# Patient Record
Sex: MALE | Race: WHITE
[De-identification: names, ages, dates, MRNs, and addresses within clinical notes are randomized per-mention and may not be internally consistent; named-entity substitution may affect disease eponyms.]

---

## 2018-05-27 ENCOUNTER — HOSPITAL ENCOUNTER (EMERGENCY)
Dept: HOSPITAL 25 - ED | Age: 43
Discharge: HOME | End: 2018-05-27
Payer: COMMERCIAL

## 2018-05-27 VITALS — DIASTOLIC BLOOD PRESSURE: 83 MMHG | SYSTOLIC BLOOD PRESSURE: 125 MMHG

## 2018-05-27 DIAGNOSIS — R42: Primary | ICD-10-CM

## 2018-05-27 DIAGNOSIS — E86.0: ICD-10-CM

## 2018-05-27 DIAGNOSIS — R53.1: ICD-10-CM

## 2018-05-27 LAB
BASOPHILS # BLD AUTO: 0.1 10^3/UL (ref 0–0.2)
EOSINOPHIL # BLD AUTO: 0.4 10^3/UL (ref 0–0.6)
HCT VFR BLD AUTO: 44 % (ref 42–52)
HGB BLD-MCNC: 14.8 G/DL (ref 14–18)
LYMPHOCYTES # BLD AUTO: 3.3 10^3/UL (ref 1–4.8)
MCH RBC QN AUTO: 30 PG (ref 27–31)
MCHC RBC AUTO-ENTMCNC: 34 G/DL (ref 31–36)
MCV RBC AUTO: 89 FL (ref 80–94)
MONOCYTES # BLD AUTO: 0.7 10^3/UL (ref 0–0.8)
NEUTROPHILS # BLD AUTO: 5.5 10^3/UL (ref 1.5–7.7)
NRBC # BLD AUTO: 0 10^3/UL
NRBC BLD QL AUTO: 0
PLATELET # BLD AUTO: 248 10^3/UL (ref 150–450)
RBC # BLD AUTO: 4.9 10^6/UL (ref 4–5.4)
WBC # BLD AUTO: 10 10^3/UL (ref 3.5–10.8)

## 2018-05-27 PROCEDURE — 82550 ASSAY OF CK (CPK): CPT

## 2018-05-27 PROCEDURE — 85025 COMPLETE CBC W/AUTO DIFF WBC: CPT

## 2018-05-27 PROCEDURE — 70450 CT HEAD/BRAIN W/O DYE: CPT

## 2018-05-27 PROCEDURE — 83735 ASSAY OF MAGNESIUM: CPT

## 2018-05-27 PROCEDURE — 84443 ASSAY THYROID STIM HORMONE: CPT

## 2018-05-27 PROCEDURE — 36415 COLL VENOUS BLD VENIPUNCTURE: CPT

## 2018-05-27 PROCEDURE — 81003 URINALYSIS AUTO W/O SCOPE: CPT

## 2018-05-27 PROCEDURE — 71045 X-RAY EXAM CHEST 1 VIEW: CPT

## 2018-05-27 PROCEDURE — 99283 EMERGENCY DEPT VISIT LOW MDM: CPT

## 2018-05-27 PROCEDURE — 84484 ASSAY OF TROPONIN QUANT: CPT

## 2018-05-27 PROCEDURE — 86140 C-REACTIVE PROTEIN: CPT

## 2018-05-27 PROCEDURE — 80053 COMPREHEN METABOLIC PANEL: CPT

## 2018-05-27 PROCEDURE — 93005 ELECTROCARDIOGRAM TRACING: CPT

## 2018-05-27 PROCEDURE — 83880 ASSAY OF NATRIURETIC PEPTIDE: CPT

## 2018-05-27 PROCEDURE — 83605 ASSAY OF LACTIC ACID: CPT

## 2018-05-27 NOTE — ED
Jonas SCHAFER Stephanie, scribed for Rojas Barrios MD on 05/27/18 at 1710 .





Dizziness





- HPI Summary


HPI Summary: 


The pt is a 42 y/o M presenting to the ED with c/o dizziness that began at 14:

00 today. Symptoms include SOB and bilateral hand tingling. He denies CP, 

palpitations or difficulty swallowing. 








- History Of Current Complaint


Chief Complaint: EDDizziness


Stated Complaint: WEAKNESS/NUMBNESS IN BOTH HANDS


Time Seen by Provider: 05/27/18 16:51


Hx Obtained From: Patient


Onset/Duration: Still Present


Timing: Hours - 3


Severity Currently: Mild


Character: Dizzy


Aggravating Factor(s): Nothing


Alleviating Factor(s): Nothing


Associated Signs And Symptoms: Positive: Negative - difficulty swallowing, SOB, 

Other: - bilateral hand tingling.  Negative: Chest Pain, Palpitations





- Allergies/Home Medications


Allergies/Adverse Reactions: 


 Allergies











Allergy/AdvReac Type Severity Reaction Status Date / Time


 


egg Allergy  Rash Verified 05/27/18 16:42











Home Medications: 


 Home Medications





Diclofenac 1% GEL (NF) [Voltaren 1% GEL (NF)] 1 applic TOPICAL DAILY PRN 05/27/ 18 [History Confirmed 05/27/18]











PMH/Surg Hx/FS Hx/Imm Hx


Sensory History: 


   Denies: Hx Legally Blind


EENT History: 


   Denies: Hx Deafness





- Surgical History


Surgery Procedure, Year, and Place: NONE


Infectious Disease History: No


Infectious Disease History: 


   Denies: Traveled Outside the [place] in Last 30 Days





- Family History


Known Family History: 


   Negative: Renal Disease





- Social History


Occupation: Employed Full-time


Lives: With Family


Alcohol Use: Rare


Hx Substance Use: No


Substance Use Type: Reports: None


Hx Tobacco Use: No


Smoking Status (MU): Never Smoked Tobacco


Have You Smoked in the Last Year: No





Review of Systems


Negative: Fever


ENT: Negative - difficulty swallowing


Negative: Palpitations, Chest Pain


Positive: Shortness Of Breath


Neurological: Other - dizziness, bilateral hand tingling


All Other Systems Reviewed And Are Negative: Yes





Physical Exam





- Summary


Physical Exam Summary: 


 VITAL SIGNS: Reviewed.


GENERAL: Patient is a well-developed and nourished MALE who is lying 

comfortable in the stretcher. Patient is not in any acute respiratory distress.


HEAD AND FACE: No signs of trauma. No ecchymosis, hematomas or skull 

depressions. No sinus tenderness.


EYES: PERRLA, EOMI x 2, No injected conjunctiva, no nystagmus.


EARS: Hearing grossly intact. Ear canals and tympanic membranes are within 

normal limits.


MOUTH: Oropharynx within normal limits.


NECK: Supple, trachea is midline, no adenopathy, no JVD, no carotid bruit, no c-

spine tenderness, neck with full ROM.


CHEST: Symmetric, no tenderness at palpation


LUNGS: Clear to auscultation bilaterally. No wheezing or crackles.


CVS: Regular rate and rhythm, S1 and S2 present, no murmurs or gallops 

appreciated.


ABDOMEN: Soft, non-tender. No signs of distention. No rebound no guarding, and 

no masses palpated. Bowel sounds are normal.


EXTREMITIES: FROM in all major joints, no edema, no cyanosis or clubbing.


NEURO: Alert and oriented x 3. No acute neurological deficits. Speech is normal 

and follows commands.


SKIN: Dry and warm








Triage Information Reviewed: Yes


Vital Signs On Initial Exam: 


 Initial Vitals











Temp Pulse Resp BP Pulse Ox


 


 97.2 F   85   16   126/88   97 


 


 05/27/18 16:42  05/27/18 16:42  05/27/18 16:42  05/27/18 16:42  05/27/18 16:42











Vital Signs Reviewed: Yes





Diagnostics





- Vital Signs


 Vital Signs











  Temp Pulse Resp BP Pulse Ox


 


 05/27/18 16:42  97.2 F  85  16  126/88  97














- Laboratory


Lab Results: 


 Lab Results











  05/27/18 05/27/18 05/27/18 Range/Units





  17:16 17:16 17:16 


 


WBC     (3.5-10.8)  10^3/ul


 


RBC     (4.0-5.4)  10^6/ul


 


Hgb     (14.0-18.0)  g/dl


 


Hct     (42-52)  %


 


MCV     (80-94)  fL


 


MCH     (27-31)  pg


 


MCHC     (31-36)  g/dl


 


RDW     (10.5-15)  %


 


Plt Count     (150-450)  10^3/ul


 


MPV     (7.4-10.4)  um3


 


Neut % (Auto)     (38-83)  %


 


Lymph % (Auto)     (25-47)  %


 


Mono % (Auto)     (0-7)  %


 


Eos % (Auto)     (0-6)  %


 


Baso % (Auto)     (0-2)  %


 


Absolute Neuts (auto)     (1.5-7.7)  10^3/ul


 


Absolute Lymphs (auto)     (1.0-4.8)  10^3/ul


 


Absolute Monos (auto)     (0-0.8)  10^3/ul


 


Absolute Eos (auto)     (0-0.6)  10^3/ul


 


Absolute Basos (auto)     (0-0.2)  10^3/ul


 


Absolute Nucleated RBC     10^3/ul


 


Nucleated RBC %     


 


Sodium  136 L    (139-145)  mmol/L


 


Potassium  3.5    (3.5-5.0)  mmol/L


 


Chloride  104    (101-111)  mmol/L


 


Carbon Dioxide  24    (22-32)  mmol/L


 


Anion Gap  8    (2-11)  mmol/L


 


BUN  25 H    (6-24)  mg/dL


 


Creatinine  1.65 H    (0.67-1.17)  mg/dL


 


Est GFR ( Amer)  58.6    (>60)  


 


Est GFR (Non-Af Amer)  45.5    (>60)  


 


BUN/Creatinine Ratio  15.2    (8-20)  


 


Glucose  107 H    ()  mg/dL


 


Lactic Acid   1.2   (0.5-2.0)  mmol/L


 


Calcium  8.8    (8.6-10.3)  mg/dL


 


Magnesium  2.1    (1.9-2.7)  mg/dL


 


Total Bilirubin  0.90    (0.2-1.0)  mg/dL


 


AST  24    (13-39)  U/L


 


ALT  43    (7-52)  U/L


 


Alkaline Phosphatase  67    ()  U/L


 


Total Creatine Kinase  181    ()  U/L


 


Troponin I  0.00    (<0.04)  ng/mL


 


C-Reactive Protein  7.67 H    (< 5.00)  mg/L


 


B-Natriuretic Peptide    9 ( - 100) pg/mL


 


Total Protein  7.1    (6.4-8.9)  g/dL


 


Albumin  4.3    (3.2-5.2)  g/dL


 


Globulin  2.8    (2-4)  g/dL


 


Albumin/Globulin Ratio  1.5    (1-3)  


 


TSH  1.71    (0.34-5.60)  mcIU/mL


 


Urine Color     


 


Urine Appearance     


 


Urine pH     (5-9)  


 


Ur Specific Gravity     (1.010-1.030)  


 


Urine Protein     (Negative)  


 


Urine Ketones     (Negative)  


 


Urine Blood     (Negative)  


 


Urine Nitrate     (Negative)  


 


Urine Bilirubin     (Negative)  


 


Urine Urobilinogen     (Negative)  


 


Ur Leukocyte Esterase     (Negative)  


 


Urine Glucose     (Negative)  














  05/27/18 05/27/18 Range/Units





  17:17 18:16 


 


WBC  10.0   (3.5-10.8)  10^3/ul


 


RBC  4.90   (4.0-5.4)  10^6/ul


 


Hgb  14.8   (14.0-18.0)  g/dl


 


Hct  44   (42-52)  %


 


MCV  89   (80-94)  fL


 


MCH  30   (27-31)  pg


 


MCHC  34   (31-36)  g/dl


 


RDW  13   (10.5-15)  %


 


Plt Count  248   (150-450)  10^3/ul


 


MPV  7.1 L   (7.4-10.4)  um3


 


Neut % (Auto)  54.8   (38-83)  %


 


Lymph % (Auto)  33.5   (25-47)  %


 


Mono % (Auto)  7.2 H   (0-7)  %


 


Eos % (Auto)  3.7   (0-6)  %


 


Baso % (Auto)  0.8   (0-2)  %


 


Absolute Neuts (auto)  5.5   (1.5-7.7)  10^3/ul


 


Absolute Lymphs (auto)  3.3   (1.0-4.8)  10^3/ul


 


Absolute Monos (auto)  0.7   (0-0.8)  10^3/ul


 


Absolute Eos (auto)  0.4   (0-0.6)  10^3/ul


 


Absolute Basos (auto)  0.1   (0-0.2)  10^3/ul


 


Absolute Nucleated RBC  0   10^3/ul


 


Nucleated RBC %  0   


 


Sodium    (139-145)  mmol/L


 


Potassium    (3.5-5.0)  mmol/L


 


Chloride    (101-111)  mmol/L


 


Carbon Dioxide    (22-32)  mmol/L


 


Anion Gap    (2-11)  mmol/L


 


BUN    (6-24)  mg/dL


 


Creatinine    (0.67-1.17)  mg/dL


 


Est GFR ( Amer)    (>60)  


 


Est GFR (Non-Af Amer)    (>60)  


 


BUN/Creatinine Ratio    (8-20)  


 


Glucose    ()  mg/dL


 


Lactic Acid    (0.5-2.0)  mmol/L


 


Calcium    (8.6-10.3)  mg/dL


 


Magnesium    (1.9-2.7)  mg/dL


 


Total Bilirubin    (0.2-1.0)  mg/dL


 


AST    (13-39)  U/L


 


ALT    (7-52)  U/L


 


Alkaline Phosphatase    ()  U/L


 


Total Creatine Kinase    ()  U/L


 


Troponin I    (<0.04)  ng/mL


 


C-Reactive Protein    (< 5.00)  mg/L


 


B-Natriuretic Peptide   ( - 100) pg/mL


 


Total Protein    (6.4-8.9)  g/dL


 


Albumin    (3.2-5.2)  g/dL


 


Globulin    (2-4)  g/dL


 


Albumin/Globulin Ratio    (1-3)  


 


TSH    (0.34-5.60)  mcIU/mL


 


Urine Color   Yellow  


 


Urine Appearance   Clear  


 


Urine pH   7.0  (5-9)  


 


Ur Specific Gravity   1.026  (1.010-1.030)  


 


Urine Protein   Negative  (Negative)  


 


Urine Ketones   Negative  (Negative)  


 


Urine Blood   Negative  (Negative)  


 


Urine Nitrate   Negative  (Negative)  


 


Urine Bilirubin   Negative  (Negative)  


 


Urine Urobilinogen   Negative  (Negative)  


 


Ur Leukocyte Esterase   Negative  (Negative)  


 


Urine Glucose   Negative  (Negative)  











Result Diagrams: 


 05/27/18 17:17





 05/27/18 17:16


Lab Statement: Any lab studies that have been ordered have been reviewed, and 

results considered in the medical decision making process.





- Radiology


  ** CXR


Xray Interpretation: No Acute Changes


Radiology Interpretation Completed By: Radiologist - No active disease. ED 

physician has reviewed this report.





- CT


  ** Brain


CT Interpretation: No Acute Changes


CT Interpretation Completed By: Radiologist - NEGATIVE EXAMINATION. ED 

physician has reviewed this report.





- EKG


  ** 17:19


Cardiac Rate: NL


EKG Rhythm: Sinus Rhythm - 82 BPM


ST Segment: Normal


Ectopy: None


EKG Interpretation: No ST elevation





Dizzy Course/Dx





- Course


Assessment/Plan: This patient is a 43-year-old male who presents to the 

emergency department with chief complaint of dizziness, not feeling well, lower 

extremity tingling.  The patient denies any chest pain, occasionally shortness 

of breath, denies any abdominal pain nausea vomiting or diarrhea.  Blood test 

results without any significant abnormality except for an increased Bielen and 

creatinine of BUN 25 and creatinine of 1.65.  CRP is 7.67.  Urinalysis is 

negative for UTI.  Chest x-ray impression: No active disease.  Head CT 

impression: Negative examination.  In the ED course the patient has remained 

asymptomatic.  The patient doesnt have any complaints.  I believe that some of 

the symptoms are secondary to his slight dehydration.  I discussed all the 

findings and test results with the patient. Patient was instructed to return to 

the emergency room immediately if any of the symptoms return or worsens. Plan 

of care was discussed with the patient and understands and agrees. All 

questions were answered at patient satisfaction.  There were no further 

complaints or concerns.  Lung exam before discharge: CTA B/L. Good air 

exchange. No wheezing or crackles heard. CVS: S1 and S2 present. No murmurs 

appreciated. Patient is alert and oriented x 3. Patient is hemodynamically 

stable. Patient will be discharged home with follow up PCP in the next 2-3 days





- Diagnoses


Provider Diagnoses: 


 Dizziness, Weakness, Dehydration








Discharge





- Sign-Out/Discharge


Documenting (check all that apply): Discharge/Admit/Transfer - Discharge





- Discharge Plan


Condition: Stable


Disposition: HOME


Patient Education Materials:  Dizziness (ED), Dehydration (ED), Weakness (ED)


Referrals: 


[facility] [place] PHYSICIAN REFERRAL [Outside] - 3 Days


Additional Instructions: 


Return to the ED for new or worsening symptoms. 





The documentation as recorded by the Jonas crisostomo Stephanie accurately reflects 

the service I personally performed and the decisions made by me, Rojas Barrios MD.

## 2018-05-27 NOTE — RAD
INDICATION: Dizziness



COMPARISON: None

 

TECHNIQUE: An AP portable view obtained at 1726 hours is submitted.



FINDINGS: 



Bones/Soft Tissues: There are no acute bony findings.    



Cardiomediastinal: The cardiomediastinal silhouette is normal. 



Lungs: There are no infiltrates.



Pleura: There are no pleural effusions. 



Other: None



IMPRESSION:  NO ACTIVE DISEASE.

## 2018-05-27 NOTE — RAD
INDICATION: Dizziness     



COMPARISON: None



TECHNIQUE: Noncontrast axial source images were acquired from the skull base to the

vertex.



FINDINGS:



Ventricles/sulci: The ventricles and cisterns are normal in size and configuration for

age.



Brain parenchyma: There is no focal parenchymal finding, evidence of intracranial mass, 

or intracranial mass effect.



Intracranial hemorrhage:None.



Extra-axial spaces: There are no abnormal extra axial fluid collections or evidence of

extra-axial mass.



Calvarium: There is no calvarial fracture or other calvarial abnormality.



Scalp: There is no evidence of scalp or extracalvarial soft tissue abnormality.



Paranasal sinuses/mastoid: The paranasal sinuses and mastoid air cells are clear.



Other: None.



IMPRESSION: NEGATIVE EXAMINATION